# Patient Record
Sex: FEMALE | Race: BLACK OR AFRICAN AMERICAN | NOT HISPANIC OR LATINO | URBAN - NONMETROPOLITAN AREA
[De-identification: names, ages, dates, MRNs, and addresses within clinical notes are randomized per-mention and may not be internally consistent; named-entity substitution may affect disease eponyms.]

---

## 2024-10-15 ENCOUNTER — HOSPITAL ENCOUNTER (EMERGENCY)
Facility: HOSPITAL | Age: 39
Discharge: HOME | End: 2024-10-15
Attending: EMERGENCY MEDICINE

## 2024-10-15 VITALS
WEIGHT: 166 LBS | RESPIRATION RATE: 18 BRPM | DIASTOLIC BLOOD PRESSURE: 79 MMHG | SYSTOLIC BLOOD PRESSURE: 133 MMHG | TEMPERATURE: 99 F | BODY MASS INDEX: 31.34 KG/M2 | OXYGEN SATURATION: 99 % | HEIGHT: 61 IN | HEART RATE: 100 BPM

## 2024-10-15 DIAGNOSIS — T14.90XA INJURY: ICD-10-CM

## 2024-10-15 DIAGNOSIS — M25.511 ACUTE PAIN OF RIGHT SHOULDER: Primary | ICD-10-CM

## 2024-10-15 PROCEDURE — 99283 EMERGENCY DEPT VISIT LOW MDM: CPT | Mod: 25

## 2024-10-15 PROCEDURE — 25000003 PHARM REV CODE 250: Performed by: NURSE PRACTITIONER

## 2024-10-15 RX ORDER — IBUPROFEN 600 MG/1
600 TABLET ORAL EVERY 6 HOURS PRN
Qty: 20 TABLET | Refills: 0 | Status: SHIPPED | OUTPATIENT
Start: 2024-10-15

## 2024-10-15 RX ORDER — KETOROLAC TROMETHAMINE 10 MG/1
10 TABLET, FILM COATED ORAL
Status: COMPLETED | OUTPATIENT
Start: 2024-10-15 | End: 2024-10-15

## 2024-10-15 RX ADMIN — KETOROLAC TROMETHAMINE 10 MG: 10 TABLET, FILM COATED ORAL at 09:10

## 2024-10-15 NOTE — DISCHARGE INSTRUCTIONS
You may apply ice to help reduce pain.  Take medication as directed, and you may also take acetaminophen as directed for pain.  Plan to follow-up with your primary doctor in 1-2 days and return to the emergency department for worsening condition.

## 2024-10-15 NOTE — Clinical Note
"Dwight "Dwight" Manuel was seen and treated in our emergency department on 10/15/2024.  She may return to work on 10/16/2024.       If you have any questions or concerns, please don't hesitate to call.      Sulma Gillette, NP"

## 2024-10-15 NOTE — ED PROVIDER NOTES
Encounter Date: 10/15/2024       History     Chief Complaint   Patient presents with    Shoulder Pain     Pt stated that she had a fall couple days ago - presents to ED with complaints of right shoulder pain - decreased ROM.     This is a 39-year-old black female with noncontributory past medical history who presents to the emergency department with complaints of right shoulder pain that began 2 days ago after sustaining a trip and fall at home.  She reports striking the posterior aspect of the shoulder on her flower bed railing and since that time has had relatively constant pain to the posterior shoulder radiating into the arm.  She also reports occasional right thumb numbness and tingling.  She reports that her pain is interfering with her ability to perform her job and is requesting work excuse.  She has not attempted to take any over-the-counter medications for her symptoms.  She is refusing medication here in the emergency department.      Review of patient's allergies indicates:  No Known Allergies  History reviewed. No pertinent past medical history.  History reviewed. No pertinent surgical history.  No family history on file.  Social History     Tobacco Use    Smoking status: Never     Passive exposure: Never    Smokeless tobacco: Never   Substance Use Topics    Drug use: Never     Review of Systems   Musculoskeletal:  Positive for arthralgias. Negative for joint swelling.   Skin: Negative.    Neurological:  Positive for numbness.       Physical Exam     Initial Vitals [10/15/24 0905]   BP Pulse Resp Temp SpO2   133/79 100 18 98.5 °F (36.9 °C) 99 %      MAP       --         Physical Exam    Nursing note and vitals reviewed.  Constitutional: She appears well-developed and well-nourished. She is active. No distress.   HENT:   Head: Normocephalic and atraumatic.   Eyes: EOM are normal. Pupils are equal, round, and reactive to light.   Neck: Neck supple.   Normal range of motion.  Cardiovascular:  Normal rate.            Pulmonary/Chest: No respiratory distress.   Musculoskeletal:         General: Tenderness present.        Arms:       Cervical back: Normal range of motion and neck supple.     Neurological: She is alert and oriented to person, place, and time. GCS eye subscore is 4. GCS verbal subscore is 5. GCS motor subscore is 6.   Skin: Skin is warm and dry. Capillary refill takes less than 2 seconds.   Psychiatric: She has a normal mood and affect. Her behavior is normal. Thought content normal.         ED Course   Procedures  Labs Reviewed - No data to display       Imaging Results              X-Ray Shoulder Trauma Right (In process)                      Medications   ketorolac tablet 10 mg (10 mg Oral Given 10/15/24 0930)     Medical Decision Making             ED Course as of 10/15/24 0938   Tue Oct 15, 2024   0937 No acute findings on right shoulder x-ray [CB]      ED Course User Index  [CB] Sulma Gillette NP                           Clinical Impression:  Final diagnoses:  [T14.90XA] Injury  [M25.511] Acute pain of right shoulder (Primary)          ED Disposition Condition    Discharge Stable          ED Prescriptions       Medication Sig Dispense Start Date End Date Auth. Provider    ibuprofen (ADVIL,MOTRIN) 600 MG tablet Take 1 tablet (600 mg total) by mouth every 6 (six) hours as needed for Pain. 20 tablet 10/15/2024 -- Sulma Gillette NP          Follow-up Information       Follow up With Specialties Details Why Contact Info    PCP Follow UP  Schedule an appointment as soon as possible for a visit in 2 days for follow-up, for re-evaluation of today's complaint              Sulma Gillette NP  10/15/24 0910